# Patient Record
Sex: MALE | Race: WHITE
[De-identification: names, ages, dates, MRNs, and addresses within clinical notes are randomized per-mention and may not be internally consistent; named-entity substitution may affect disease eponyms.]

---

## 2018-09-20 ENCOUNTER — HOSPITAL ENCOUNTER (INPATIENT)
Dept: HOSPITAL 96 - M.ERS | Age: 83
LOS: 5 days | Discharge: NURSING, REHAB OR LONG-TERM CARE | DRG: 286 | End: 2018-09-25
Attending: INTERNAL MEDICINE | Admitting: INTERNAL MEDICINE
Payer: COMMERCIAL

## 2018-09-20 VITALS — DIASTOLIC BLOOD PRESSURE: 48 MMHG | SYSTOLIC BLOOD PRESSURE: 141 MMHG

## 2018-09-20 VITALS — SYSTOLIC BLOOD PRESSURE: 170 MMHG | DIASTOLIC BLOOD PRESSURE: 50 MMHG

## 2018-09-20 VITALS — DIASTOLIC BLOOD PRESSURE: 48 MMHG | SYSTOLIC BLOOD PRESSURE: 165 MMHG

## 2018-09-20 VITALS — BODY MASS INDEX: 23.4 KG/M2 | WEIGHT: 158 LBS | HEIGHT: 69.02 IN

## 2018-09-20 VITALS — SYSTOLIC BLOOD PRESSURE: 147 MMHG | DIASTOLIC BLOOD PRESSURE: 48 MMHG

## 2018-09-20 DIAGNOSIS — L89.892: ICD-10-CM

## 2018-09-20 DIAGNOSIS — Z79.899: ICD-10-CM

## 2018-09-20 DIAGNOSIS — Z79.82: ICD-10-CM

## 2018-09-20 DIAGNOSIS — R07.89: ICD-10-CM

## 2018-09-20 DIAGNOSIS — J15.6: ICD-10-CM

## 2018-09-20 DIAGNOSIS — Z95.820: ICD-10-CM

## 2018-09-20 DIAGNOSIS — I50.22: ICD-10-CM

## 2018-09-20 DIAGNOSIS — L03.116: ICD-10-CM

## 2018-09-20 DIAGNOSIS — E11.51: ICD-10-CM

## 2018-09-20 DIAGNOSIS — Z79.4: ICD-10-CM

## 2018-09-20 DIAGNOSIS — I70.203: ICD-10-CM

## 2018-09-20 DIAGNOSIS — I25.5: ICD-10-CM

## 2018-09-20 DIAGNOSIS — R00.1: ICD-10-CM

## 2018-09-20 DIAGNOSIS — L02.212: ICD-10-CM

## 2018-09-20 DIAGNOSIS — I25.10: Primary | ICD-10-CM

## 2018-09-20 DIAGNOSIS — I11.0: ICD-10-CM

## 2018-09-20 DIAGNOSIS — L72.3: ICD-10-CM

## 2018-09-20 DIAGNOSIS — R13.10: ICD-10-CM

## 2018-09-20 DIAGNOSIS — Z88.8: ICD-10-CM

## 2018-09-20 DIAGNOSIS — G20: ICD-10-CM

## 2018-09-20 LAB
ABSOLUTE BASOPHILS: 0 THOU/UL (ref 0–0.2)
ABSOLUTE EOSINOPHILS: 0.2 THOU/UL (ref 0–0.7)
ABSOLUTE MONOCYTES: 0.8 THOU/UL (ref 0–1.2)
ALBUMIN SERPL-MCNC: 2.4 G/DL (ref 3.4–5)
ALP SERPL-CCNC: 110 U/L (ref 46–116)
ALT SERPL-CCNC: 5 U/L (ref 30–65)
ANION GAP SERPL CALC-SCNC: 2 MMOL/L (ref 7–16)
AST SERPL-CCNC: 19 U/L (ref 15–37)
BASOPHILS NFR BLD AUTO: 0.2 %
BILIRUB SERPL-MCNC: 0.7 MG/DL
BUN SERPL-MCNC: 30 MG/DL (ref 7–18)
CALCIUM SERPL-MCNC: 8.4 MG/DL (ref 8.5–10.1)
CHLORIDE SERPL-SCNC: 105 MMOL/L (ref 98–107)
CO2 SERPL-SCNC: 30 MMOL/L (ref 21–32)
CREAT SERPL-MCNC: 1.3 MG/DL (ref 0.6–1.3)
EOSINOPHIL NFR BLD: 1.9 %
GLUCOSE SERPL-MCNC: 122 MG/DL (ref 70–99)
GRANULOCYTES NFR BLD MANUAL: 80 %
HCT VFR BLD CALC: 37.6 % (ref 42–52)
HGB BLD-MCNC: 12.2 GM/DL (ref 14–18)
LIPASE: 106 U/L (ref 73–393)
LYMPHOCYTES # BLD: 1.4 THOU/UL (ref 0.8–5.3)
LYMPHOCYTES NFR BLD AUTO: 11.3 %
MAGNESIUM SERPL-MCNC: 1.8 MG/DL (ref 1.8–2.4)
MCH RBC QN AUTO: 31.6 PG (ref 26–34)
MCHC RBC AUTO-ENTMCNC: 32.4 G/DL (ref 28–37)
MCV RBC: 97.4 FL (ref 80–100)
MONOCYTES NFR BLD: 6.6 %
MPV: 7.1 FL. (ref 7.2–11.1)
NEUTROPHILS # BLD: 10.2 THOU/UL (ref 1.6–8.1)
NT-PRO BRAIN NAT PEPTIDE: 8907 PG/ML (ref ?–300)
NUCLEATED RBCS: 0 /100WBC
PLATELET COUNT*: 292 THOU/UL (ref 150–400)
POTASSIUM SERPL-SCNC: 4.2 MMOL/L (ref 3.5–5.1)
PROT SERPL-MCNC: 6.4 G/DL (ref 6.4–8.2)
RBC # BLD AUTO: 3.86 MIL/UL (ref 4.5–6)
RDW-CV: 13.2 % (ref 10.5–14.5)
SODIUM SERPL-SCNC: 137 MMOL/L (ref 136–145)
TROPONIN-I LEVEL: <0.06 NG/ML (ref ?–0.06)
WBC # BLD AUTO: 12.7 THOU/UL (ref 4–11)

## 2018-09-20 NOTE — EKG
[place] [address]
Phone:  [phone]                     ELECTROCARDIOGRAM REPORT      
_______________________________________________________________________________
 
Name:       TONY RICHARDSON              Room:           [address]    ADM [place]  
.R.#:  [id number]      Account #:      [id number]  
Admission:  18     Attend Phys:    Sarah Pacheco MD 
Discharge:               Date of Birth:  [birth year]  
         Report #: [id number]
    [id number]
_______________________________________________________________________________
THIS REPORT FOR:  //name//                      
 
                         [facility] ED
                                       
Test Date:    2018               Test Time:    11:09:21
Pat Name:     TONY RICHARDSON          Department:   
Patient ID:   [id number]            Room:         [facility]
Gender:       M                        Technician:   JESSICA ARCINIEGA
:          1935               Requested By: Lalito Mi
Order Number: [id number] MD:   Armando Gonzalez
                                 Measurements
Intervals                              Axis          
Rate:         53                       P:            22
ND:           178                      QRS:          25
QRSD:         112                      T:            227
QT:           456                                    
QTc:          429                                    
                           Interpretive Statements
Sinus rhythm
Probable LVH with secondary repol abnrm
Possible inferior infarct, age indeterminate
Baseline wander in lead(s) V1,V3
No previous ECG available for comparison
 
Electronically Signed On 2018 16:48:41 CDT by Armando Gonzalez
[email or web address]
 
 
 
 
 
 
 
 
 
 
 
 
 
 
 
 
 
  <ELECTRONICALLY SIGNED>
                                           By: Armando Gonzalez MD, FACC     
  18     1648
D: 18 1109   _____________________________________
T: 18 1109   Armando Gonzalez MD, [facility]       /EPI

## 2018-09-20 NOTE — NUR
PT. ARRIVED TO UNIT AT APPROX. 1545.  PT A/OX4 WITH SOME NOTED FORGETFULNESS,
VSS, MONITOR PLACED TRACING SB.  PT. STATES HE HAS RIGHT SIDED CHEST PAIN WITH
BREATHING/MOVEMENT 3/10.  ADMISSION AND ASSESSMENT PROCESS  COMPLETED, REFER
TO CHARTING.  PT. PARKINSON MEDS REORDERED PER HOME DOSE FROM DAUGHTER PER
REQUEST.  ALL OTHER HOME MEDS REORDERED AS WELL.  DR. CHAN ON UNIT TO SEE PT,
ABLE TO DISCUSS PLAN WITH DAUGHTER AT BEDSIDE.  PT. DAUGHTER DOES WISH TO
SPEAK WITH CM ABOUT POSSIBLE PLACEMENT, SHE WISHES TO SPEAK WITH THEM PRIOR TO
THEM SPEAKING WITH PT.  PT. CURRENTLY LIVES AT HOME WITH DAUGHTER LILIAN.  PT.
ORIENTED TO ROOM/PROCEDURE.  CALL LIGHT IN REACH, FALL PRECAUTIONS IN PLACE.
WILL CONTINUE WITH PLAN OF CARE.

## 2018-09-21 VITALS — SYSTOLIC BLOOD PRESSURE: 151 MMHG | DIASTOLIC BLOOD PRESSURE: 62 MMHG

## 2018-09-21 VITALS — SYSTOLIC BLOOD PRESSURE: 82 MMHG | DIASTOLIC BLOOD PRESSURE: 58 MMHG

## 2018-09-21 VITALS — DIASTOLIC BLOOD PRESSURE: 63 MMHG | SYSTOLIC BLOOD PRESSURE: 177 MMHG

## 2018-09-21 VITALS — SYSTOLIC BLOOD PRESSURE: 109 MMHG | DIASTOLIC BLOOD PRESSURE: 79 MMHG

## 2018-09-21 VITALS — DIASTOLIC BLOOD PRESSURE: 69 MMHG | SYSTOLIC BLOOD PRESSURE: 168 MMHG

## 2018-09-21 VITALS — DIASTOLIC BLOOD PRESSURE: 58 MMHG | SYSTOLIC BLOOD PRESSURE: 93 MMHG

## 2018-09-21 VITALS — SYSTOLIC BLOOD PRESSURE: 143 MMHG | DIASTOLIC BLOOD PRESSURE: 53 MMHG

## 2018-09-21 VITALS — SYSTOLIC BLOOD PRESSURE: 115 MMHG | DIASTOLIC BLOOD PRESSURE: 61 MMHG

## 2018-09-21 VITALS — SYSTOLIC BLOOD PRESSURE: 169 MMHG | DIASTOLIC BLOOD PRESSURE: 65 MMHG

## 2018-09-21 VITALS — SYSTOLIC BLOOD PRESSURE: 177 MMHG | DIASTOLIC BLOOD PRESSURE: 68 MMHG

## 2018-09-21 VITALS — DIASTOLIC BLOOD PRESSURE: 71 MMHG | SYSTOLIC BLOOD PRESSURE: 173 MMHG

## 2018-09-21 VITALS — SYSTOLIC BLOOD PRESSURE: 172 MMHG | DIASTOLIC BLOOD PRESSURE: 56 MMHG

## 2018-09-21 VITALS — DIASTOLIC BLOOD PRESSURE: 60 MMHG | SYSTOLIC BLOOD PRESSURE: 158 MMHG

## 2018-09-21 VITALS — SYSTOLIC BLOOD PRESSURE: 159 MMHG | DIASTOLIC BLOOD PRESSURE: 64 MMHG

## 2018-09-21 LAB
ANION GAP SERPL CALC-SCNC: 6 MMOL/L (ref 7–16)
BUN SERPL-MCNC: 32 MG/DL (ref 7–18)
CALCIUM SERPL-MCNC: 7.9 MG/DL (ref 8.5–10.1)
CHLORIDE SERPL-SCNC: 105 MMOL/L (ref 98–107)
CHOLEST SERPL-MCNC: 157 MG/DL (ref ?–200)
CO2 SERPL-SCNC: 26 MMOL/L (ref 21–32)
CREAT SERPL-MCNC: 1.2 MG/DL (ref 0.6–1.3)
GLUCOSE SERPL-MCNC: 112 MG/DL (ref 70–99)
HDLC SERPL-MCNC: 54 MG/DL (ref 40–?)
LDLC SERPL-MCNC: 91 MG/DL (ref ?–100)
POTASSIUM SERPL-SCNC: 4.2 MMOL/L (ref 3.5–5.1)
SODIUM SERPL-SCNC: 137 MMOL/L (ref 136–145)
TC:HDL: 2.9 RATIO
TRIGL SERPL-MCNC: 61 MG/DL (ref ?–150)
VLDLC SERPL CALC-MCNC: 12 MG/DL (ref ?–40)

## 2018-09-21 PROCEDURE — B2111ZZ FLUOROSCOPY OF MULTIPLE CORONARY ARTERIES USING LOW OSMOLAR CONTRAST: ICD-10-PCS | Performed by: INTERNAL MEDICINE

## 2018-09-21 PROCEDURE — 4A023N7 MEASUREMENT OF CARDIAC SAMPLING AND PRESSURE, LEFT HEART, PERCUTANEOUS APPROACH: ICD-10-PCS | Performed by: INTERNAL MEDICINE

## 2018-09-21 PROCEDURE — B2151ZZ FLUOROSCOPY OF LEFT HEART USING LOW OSMOLAR CONTRAST: ICD-10-PCS | Performed by: INTERNAL MEDICINE

## 2018-09-21 NOTE — NUR
PT ALERT ORIENTED X 4. PARKENSON'S TREMORS NOTED. UP TO BSC WITH ASSIST OF
ONE. PT WEARING BRIEF WHEN HE CAME IN. REMOVED FOR SLEEP. PT ENCOURAGED TO
CALL OUT TO STAFF FOR ASSIT WITH VOIDING. RESTING COMFORTABLY. L FOOT ULCER
PICTURED AND PLACED IN CHART. WOUND NURSE CONSULTED. COCCYX SLIGHTLY RED. PT
TURNED ON SIDE AT TIMES REFUSES TO BE ON SIDE AND LAYS FLAT ON BACK. TELEMETRY
SHOWS SR/SB HR 60S-40S. WILL CONTINUE TO MONITOR.

## 2018-09-21 NOTE — CARD
[address]  [address]                    CARDIAC CATH REPORT           
_______________________________________________________________________________
 
Name:       TONY RICHARDSON VALDEMAR              Room:           [address] [place]  
.R.#:  [id number]      Account #:      [id number]  
Admission:  09/20/18     Attend Phys:    Sarah Pacheco MD 
Discharge:               Date of Birth:  [birth year]  
         Report #: [id number]
                                                                     [id number]
_______________________________________________________________________________
THIS REPORT FOR:  //name//                      
 
 
--------------- ADDENDUM APPROVED REPORT --------------
 
 
Study performed:  09/21/2018 11:02:06
 
Patient Details
Patient Status: In-Patient                  Room #: 214
The patient is a 83 year-old male
 
Event Personnel
Geovani Garcia  Cardiologist, Madelin Ruth, Cezar Loera (Osiris Steele Tina RN Circulator
 
Procedures Performed
Art Access - R radial artery  , Left Heart CatheterizationLeft Heart 
Cath w/or w/o Coronaries [id number] [facility] , Selective Right and Left 
Coronary Angiography
 
Procedure Narrative
The patient was brought electively to the Cardiac Catheterization 
Laboratory and was prepped and draped in a sterile manner. The right 
femoral was infiltrated with 2% Lidocaine subcutaneous anesthesia. A 
6fr Ultimum Sheath sheath was inserted into the right femoral artery. 
Coronary angiography was performed using coronary diagnostic 
catheters. The right coronary system was accessed and visualized with 
a 6fr JR 4 catheter. The left coronary system was accessed and 
visualized with a 6fr JL 4 catheter. The left ventricle was accessed 
and visualized with a 6fr Angled Pigtail catheter. Left 
ventricular/Aortic Valve gradient assessed via catheter pullback. 
Left ventriculogram was performed in PELLETIER projection. Pre-demployment 
femoral angiogram was performed . Closure device was deployed with a 
6 Fr MynxGrip 6/7FMynx. The patient tolerated the procedure well and 
there were no complications associated with the procedure. 
 
Intraoperative Conscious Sedation
Sedation start time:  12:23           Case end Time:  12:52   
No sedation given.      
 
Fluoro Time:    3.2 minutes     
Dose:     DAP 26066 cGycm2  886.21 mGy  
Contrast Type and Amount:  Visipaque 180 ml    
 
Diagnostic Cath
 
 
 
[place] [address]                    CARDIAC CATH REPORT           
_______________________________________________________________________________
 
Name:       TONY RICHARDSON              Room:           [address] [place]  
[facility]#:  [id number]      Account #:      [id number]  
Admission:  09/20/18     Attend Phys:    Sarah Pacheco MD 
Discharge:               Date of Birth:  [birth year]  
         Report #: [id number]
                                                                     [id number]
_______________________________________________________________________________
Left Main normal
LAD  proximal 80%, long 95% mid body, small caliber distally, 
severely diseased
Diagonal 1 small
Circumflex nondomnant, occluded proximally
collaterals present
OM1  long 95%, RI distribution
Right Coronary proximal 80%
R PDA  proximal 70%
RPLV  large severe 90% distal
 
Left Ventriculography
The left ventricle is moderately dilated in size with reduced 
contractility. The left ventricular ejection fraction is estimated to 
be 25-30%. Left ventricular wall motion abnormalities are present. 
There is no mitral insufficiency. severe apical hypokinesis, global 
dysfxn., no thrombus
 
Hemodynamics
The aortic pressure is 153/44 mmHg with a mean of mmHg. The left 
ventricular pressure is 157/16 mmHg with a mean of mmHg. The left 
ventricular end diastolic pressure is 28 mmHg. There was no gradient 
across the aortic valve upon pullback. 
 
Conclusion
1. multivessel CAD, as noted above
2. ischemic cardiomyopathy, severe LV dysfunction 
 
Recommendations
Aggressive Medical Therapy
consider high risk PCI
 
 
 
 
 
 
 
 
 
 
 
 
 
<ELECTRONICALLY SIGNED>
                                        By:  Geovani Garcia MD, FACC   
09/21/18     1620
D: 09/21/18 1620_______________________________________
T: 09/21/18 1620Geovani Garcia MD, FACC      /INF

## 2018-09-21 NOTE — NUR
PT JUST RETURNED FROM CATH, MET WITH DTR/LILIAN IN WAITING ROOM PER REQUEST. DTR
TEARFUL AFTER RESULTS OF CATH.  DISCUSSED HOME SITUATION/DC PLANNING. PT LIVES
WITH DTR AND BELLE/WHO IS A PHYSICAL THERAPIST.  PT USES WALKER AND NEEDS ASSIST
WITH ADLS, HAS BEEN NEEDING INCREASED HELP LATELY. DTR ALSO STATES FORGETFUL
AND HAVING CONCERNS OVER SAFETY ISSUES.  PT USED TO LIVE IN [facility] AT THE [place],
IS RETIRED .  DTR STATED THEY WERE INTERESTED IN SNF AND POSSIBLE LTC.
GAVE LIST, STATED PT HAD MENTIONED [facility].  DID CALL AND LEAVE VMAIL FOR
ADMISSIONS, AWAIT CALL BACK.  TALKED WITH DTR ABOUT LTC TRANSITION IF THEY
DECIDE TO DO THAT.  THEY WILL CONSIDER OTHER OPTIONS AND AWAIT THERAPY EVALS.
ANTICIPATE DC FIRST OF WEEK.  COPY OF DPOA AND AD ON CHART.  DTR/LILIAN IS
PRIMARY DPOA, OTHER CHILDREN LIVE OUT OF TOWN. WILL FOLLOW

## 2018-09-21 NOTE — NUR
Nutrition: PMHx: DM, CHF, PVD, HTN, Parkinsons. Admitted for chest pain. Has
Left foot wound. RX: insulin, aspirin. +BM. Wt: 150#. NPO currently. Labs: alb
2.4, prealb 13.1, BG .
Increased nutrient needs R/T wound healing AEB foot wound, PVD, labs above.
RD ordered Melvin b.i.d. to aid in wound healing.
Consider Mild nutritional risk at this time. Will follow up 9/26/18.

## 2018-09-21 NOTE — NUR
DAUGHTER REFUSED PM INSULIN STATING HE TAKES IT IN THE AM BECAUSE HIS BS DROP.
PM INSULIN HELD PER DTR REQUEST. INITALLY ON RA. O2 PLACED ON PT PER PT FOR
SAT 89%

## 2018-09-21 NOTE — NUR
WOUND CARE NOTE:  CONSULT RECEIVED FOR EVAL FOOT DECUB.
 
PATIENT PRESENTS WITH A FULL THICKNESS ULCERATION TO THE LEFT LATERAL FOOT AT
THE 5TH METATARSAL HEAD.  WOUND BED IS RED, MOIST WITH A SMALL AREA OF YELLOW
SLOUGH.  MANOJ-WOUND IS EDEMATOUS AND RED.  PALPABLE PEDAL PULSES.  2+ EDEMA TO
LOWER EXTREMITY.  PATIENT HAS HAD HIS 2ND TOE ON THE LEFT FOOT REMOVED,
INCISION LINE HAS HEALED WELL.  PATIENT STATES THAT HE HAD IT REMOVED A COUPLE
OF YEARS AGO.  PATIENT IS UNSURE OF THE ETIOLOGY OF THE WOUND, BUT HE DOES
HAVE DIABETES.  ADMITS TO HAVING SHOOTING PAINS IN HIS LEFT FOOT AT NIGHT.
AFTER CLEANSING THE WOUND, APPLIED  AQUACEL AG TO WOUND BED THEN SECURED WITH
BORDERED FOAM.
 
EDUCATED PATIENT ON DRESSING SELECTION, COMMUNICATED UNDERSTANDING.
 
RECOMMEND
TIGHT BLOOD GLUCOSE CONTROL
ENCOURAGE GOOD NUTRTION/HYDRATION
VASCULAR SURGERY CONSULT-ABNORMAL ARTERIAL STUDIES AND WOUND CARE
FOLLOW UP IN WOUND CENTER
CULTURES OF LEFT FOOT WOUND-DONE
OFFLOAD PRESSURE AT WOUND SITE

## 2018-09-22 VITALS — SYSTOLIC BLOOD PRESSURE: 124 MMHG | DIASTOLIC BLOOD PRESSURE: 59 MMHG

## 2018-09-22 VITALS — SYSTOLIC BLOOD PRESSURE: 142 MMHG | DIASTOLIC BLOOD PRESSURE: 46 MMHG

## 2018-09-22 VITALS — SYSTOLIC BLOOD PRESSURE: 132 MMHG | DIASTOLIC BLOOD PRESSURE: 54 MMHG

## 2018-09-22 VITALS — SYSTOLIC BLOOD PRESSURE: 150 MMHG | DIASTOLIC BLOOD PRESSURE: 54 MMHG

## 2018-09-22 VITALS — SYSTOLIC BLOOD PRESSURE: 144 MMHG | DIASTOLIC BLOOD PRESSURE: 48 MMHG

## 2018-09-22 VITALS — SYSTOLIC BLOOD PRESSURE: 158 MMHG | DIASTOLIC BLOOD PRESSURE: 47 MMHG

## 2018-09-22 LAB
ABSOLUTE BASOPHILS: 0 THOU/UL (ref 0–0.2)
ABSOLUTE EOSINOPHILS: 0.4 THOU/UL (ref 0–0.7)
ABSOLUTE MONOCYTES: 1 THOU/UL (ref 0–1.2)
ALBUMIN SERPL-MCNC: 2.1 G/DL (ref 3.4–5)
ALP SERPL-CCNC: 102 U/L (ref 46–116)
ALT SERPL-CCNC: < 6 U/L (ref 30–65)
ANION GAP SERPL CALC-SCNC: 4 MMOL/L (ref 7–16)
AST SERPL-CCNC: 18 U/L (ref 15–37)
BASOPHILS NFR BLD AUTO: 0.1 %
BILIRUB SERPL-MCNC: 0.4 MG/DL
BUN SERPL-MCNC: 29 MG/DL (ref 7–18)
CALCIUM SERPL-MCNC: 7.5 MG/DL (ref 8.5–10.1)
CHLORIDE SERPL-SCNC: 107 MMOL/L (ref 98–107)
CO2 SERPL-SCNC: 29 MMOL/L (ref 21–32)
CREAT SERPL-MCNC: 1.3 MG/DL (ref 0.6–1.3)
EOSINOPHIL NFR BLD: 4 %
GLUCOSE SERPL-MCNC: 94 MG/DL (ref 70–99)
GRANULOCYTES NFR BLD MANUAL: 73 %
HCT VFR BLD CALC: 32.8 % (ref 42–52)
HGB BLD-MCNC: 10.9 GM/DL (ref 14–18)
LYMPHOCYTES # BLD: 1.4 THOU/UL (ref 0.8–5.3)
LYMPHOCYTES NFR BLD AUTO: 13.4 %
MAGNESIUM SERPL-MCNC: 1.8 MG/DL (ref 1.8–2.4)
MCH RBC QN AUTO: 32.7 PG (ref 26–34)
MCHC RBC AUTO-ENTMCNC: 33.3 G/DL (ref 28–37)
MCV RBC: 98 FL (ref 80–100)
MONOCYTES NFR BLD: 9.5 %
MPV: 7.7 FL. (ref 7.2–11.1)
NEUTROPHILS # BLD: 7.6 THOU/UL (ref 1.6–8.1)
NUCLEATED RBCS: 0 /100WBC
PLATELET COUNT*: 250 THOU/UL (ref 150–400)
POTASSIUM SERPL-SCNC: 4.3 MMOL/L (ref 3.5–5.1)
PROT SERPL-MCNC: 5.3 G/DL (ref 6.4–8.2)
RBC # BLD AUTO: 3.34 MIL/UL (ref 4.5–6)
RDW-CV: 13.1 % (ref 10.5–14.5)
SODIUM SERPL-SCNC: 140 MMOL/L (ref 136–145)
WBC # BLD AUTO: 10.5 THOU/UL (ref 4–11)

## 2018-09-22 NOTE — NUR
VSS,CARDIAC MONITORING IN PLACE WITH NO CHANGES.PT REMAINS ON 2L O2 NC.PT
PROGRESSING TOWARDS GOALS.NO C/O PAIN.IV ANTIBIOTICS GIVEN.Q2 HOUR POSITION
CHANGE.PT WORKED WITH PT/OT AND WAS UP IN CHAIR FOR A COUPLE OF HOURS.HOURLY
ROUNDING COMPLETED FOR PT SAFETY.CALL LIGHT AND FALL PRECAUTIONS IN PLACE.WILL
CONTINUE TO MONITOR FOR DURATION OF SHIFT.

## 2018-09-22 NOTE — NUR
END SHIFT: PT WAS ANXIOUS AT BEG OF SHIFT D/T CATH RESULTS TODAY, HOWEVER HE
RESTED WELL WITH NO OTHER COMPLAINTS. NO PAIN. REFUSED TURNING AT TIMES.
INCONT EPISODES NOTED. TREMOR RIGHT SIDE. RIGHT GROIN CDI. SR 1ST DEG ON
MONITOR. CLARIFIED WITH DAUGHTER WHO DOES PATIENTS MEDS AT HOME THAT HE IN
FACT DOES TAKE LANTUS IN THE AM AND NOT PM. WOUND DRESSING TO LEFT FOOT IS DRY
AND INTACT. SAFETY PRECAUTIONS IN PLACE. CALL LIGHT IN REACH. PERFORMED HOURLY
ROUNDING. WILL CONT TO MONITOR.

## 2018-09-22 NOTE — NUR
RECEIVED WRITTEN REPORT AND ASSUMED CARE OF PT @ 0800.PT IS A/O BUT FORGETFUL
AT TIMES.VSS,TRACING SB ON THE MONITOR.LUNG SOUNDS ARE CLEAR DIMINSHED ON ROOM
AIR.LAST BM WAS 09/20/18.PT IS INCONT OF BLADDER.IV PATENT AND SALINE
LOCKED.PT IS CALM AND COOPERATIVE WITH NO C/O PAIN AT TIME OF
ASSESSMENT.FAMILY AT BEDSIDE.PT IS UP WITH 1-2 ASSIST TO BSC OR CHAIR.PT SAT
UP IN CHAIR FOR SEVERAL HOURS THIS AM.PT LEFT RESTING IN BED WITH CALL LIGHT
AND FALL PRECAUTIONS IN PLACE.WILL CONTINUE TO MONITOR.

## 2018-09-23 VITALS — SYSTOLIC BLOOD PRESSURE: 162 MMHG | DIASTOLIC BLOOD PRESSURE: 61 MMHG

## 2018-09-23 VITALS — SYSTOLIC BLOOD PRESSURE: 150 MMHG | DIASTOLIC BLOOD PRESSURE: 52 MMHG

## 2018-09-23 VITALS — SYSTOLIC BLOOD PRESSURE: 166 MMHG | DIASTOLIC BLOOD PRESSURE: 80 MMHG

## 2018-09-23 VITALS — SYSTOLIC BLOOD PRESSURE: 174 MMHG | DIASTOLIC BLOOD PRESSURE: 79 MMHG

## 2018-09-23 VITALS — DIASTOLIC BLOOD PRESSURE: 49 MMHG | SYSTOLIC BLOOD PRESSURE: 136 MMHG

## 2018-09-23 VITALS — DIASTOLIC BLOOD PRESSURE: 57 MMHG | SYSTOLIC BLOOD PRESSURE: 143 MMHG

## 2018-09-23 NOTE — NUR
VSS,CARDIAC MONITORING IN PLACE WITH NO CHANGES.PT ON ROOM AIR.PT PROGRESSING
TOWARDS GOALS.NO C/O PAIN.IV ANTIBIOTICS GIVEN.Q2 HOUR POSITION
CHANGE.PICTURES TAKEN OF WOUNDS.DRESSING CHANGED.PT UP TO CHAIR FOR SEVERAL
HOURS.HOURLY ROUNDING COMPLETED FOR PT SAFETY.CALL LIGHT AND FALL PRECAUTIONS
IN PLACE.WILL CONTINUE TO MONITOR FOR DURATION OF SHIFT.

## 2018-09-23 NOTE — NUR
RECEIVED REPORT FROM MALGORZATA AND ASSUMED CARE @ 3095.PT IS A/O BUT FORGETFUL AT
TIMES.VSS, TRACING SB ON THE MONITOR.PT HAD CRITICALLY LOW BLOOD GLUCOSE THIS
AM-DRANK JUICE AND PEANUT BUTTER AND LILLI CRACKERS TO INCREASE.LUNG SOUNDS
ARE CLEAR DIMINSHED.LAST BM WAS YESTERDAY.IV PATENT AND SALINE LOCKED.PT IS
CALM AND COOPERATIVE WITH NO C/O PAIN AT TIME OF ASSESSMENT.PT IS UP WITH TWO
TO BSC OR CHAIR.PT LEFT RESTING IN BED WITH CALL LIGHT AND FALL PRECAUTIONS IN
PLACE. WILL CONTINUE TO MONITOR.

## 2018-09-23 NOTE — NUR
ASSUMED CARE OF PATIENT AT 1900 THE PATIENT REMAINS SB ON THE TELEMONITOR
O2 SAT IS MAINTAINED ON 2L NC
THE PATIENT CONTINUES TO HAVE INCONT EPISODES HOWEVER IS ABLE TO ALERT STAFF
AFTER OCCURENCE
ROUTINE INTERVENTIONS CONTINUE TO BE EFFECTIVE FOR SX MANAGEMENT
PATIENT CONTINUES TO PROGRESS TOWARDS GOALS
NIGHT UNEVENTFUL
SAFETY INTERVENTIONS CONTINUE BED LOWERED WHEELS LOCKED SIDE RAILS UP X 2 CALL
LIGHT IN REACH REPORT TO BE GIVEN TO GARRETT MCMANUS

## 2018-09-24 VITALS — DIASTOLIC BLOOD PRESSURE: 69 MMHG | SYSTOLIC BLOOD PRESSURE: 137 MMHG

## 2018-09-24 VITALS — DIASTOLIC BLOOD PRESSURE: 57 MMHG | SYSTOLIC BLOOD PRESSURE: 115 MMHG

## 2018-09-24 VITALS — SYSTOLIC BLOOD PRESSURE: 151 MMHG | DIASTOLIC BLOOD PRESSURE: 76 MMHG

## 2018-09-24 VITALS — DIASTOLIC BLOOD PRESSURE: 68 MMHG | SYSTOLIC BLOOD PRESSURE: 138 MMHG

## 2018-09-24 VITALS — SYSTOLIC BLOOD PRESSURE: 115 MMHG | DIASTOLIC BLOOD PRESSURE: 51 MMHG

## 2018-09-24 VITALS — DIASTOLIC BLOOD PRESSURE: 74 MMHG | SYSTOLIC BLOOD PRESSURE: 138 MMHG

## 2018-09-24 PROCEDURE — 0W9K0ZZ DRAINAGE OF UPPER BACK, OPEN APPROACH: ICD-10-PCS

## 2018-09-24 NOTE — NUR
HEARD BACK FROM [facility], THEY DO HAVE A BED AVAILABLE. FAXED REFERRAL TO
HER FOR SNF

## 2018-09-24 NOTE — NUR
RE: HEART FAILURE MEDICATION EDUCATION. ATTEMPTED TO SPEAK W/PT ABOUT HEART
FAILURE MEDICATION. PT'S DAUGHTER REQUESTED I SPEAK TO HER, STATING SHE
COORDINATES HIS MEDICATION & PT WOULD NOT BE RECEPTIVE TO DISCUSSION.
DISCUSSION FOCUSED ON CARVEDILOL, LISINOPRIL AND SPIRONOLACTONE. WE REVIEWED
RATIONALE FOR THERAPY. DISCUSSED IMPORTANCE ADHERENCE TO PRESCRIBED REGIMEN.
WE REVIEWED POSSIBLE SIDE EFFECTS OF MEDICATION AND MANAGEMENT OF RISKS OF
THESE EFFECTS. LEFT MEDICATION INFORMATION WITH PT'S DAUGHTER. PROVIDED
PHARMACY CONTACT INFORMATION FOR ANY FURTHER QUESTIONS OR ISSUES. THANK YOU.

## 2018-09-24 NOTE — NUR
RECEIVED REPORT AND ASSUMED CARE OF PT, ASSESSMENT COMPLETED. C/O PAIN INTO
BACK INCISION. WILL OBTAIN MED ORDER. DRSG DRY AND INTACT. INCONT OF URINE,
ASSISTED WITH REPOSITIONING FOR CLEANING. SACRAL AREA RED, NO BREAKDOWN NOTED.
DRSG TO LT FOOT DRY AND INTACT. TELEMETRY ON SHOWING SR. WILL CONT TO MONITOR
AND ASSIST AS NEEDED.

## 2018-09-24 NOTE — NUR
PT IS ABLE TO COMMUNICATE HIS NEEDS TO STAFF EFFECTIVELY, HE IS FORGETFUL AT
TIMES. HE HAS DENIED THE NEED FOR PAIN MEDICATION UP TO THIS TIME. TREMOURS
FREQUENTLY; PARKINSON'S DISEASE. PT/OT/ST CONSULTED. POSSIBLE D/C TO A SNF IN
A DAY OR TWO.

## 2018-09-25 VITALS — SYSTOLIC BLOOD PRESSURE: 140 MMHG | DIASTOLIC BLOOD PRESSURE: 69 MMHG

## 2018-09-25 VITALS — SYSTOLIC BLOOD PRESSURE: 114 MMHG | DIASTOLIC BLOOD PRESSURE: 46 MMHG

## 2018-09-25 VITALS — DIASTOLIC BLOOD PRESSURE: 65 MMHG | SYSTOLIC BLOOD PRESSURE: 137 MMHG

## 2018-09-25 NOTE — NUR
SPOKE WITH [facility] AGAIN, SHE STATED THAT INSURANCE WAS ASKING FOR MORE
DETAILS ON OT EVAL.  CALL TO INSURANCE REVIEWER, SHE STATED SHE HAD INFO IN
EMR AND WOULD REVIEW IT SOON FOR SNF AUTH, MAY NOT HAVE BY END OF DAY TODAY.
UPDATED [facility] ON ISSUE.  AWAIT CALL BACK WITH AUTH

## 2018-09-25 NOTE — NUR
PATIENT DISCHARGED TO [facility]
ALL DISCHARGE INFORMATION GIVEN, COPIES SENT
IV AND HEART MONITOR REMOVED
PAERSONAL BELONGINGS RETURNED
REPORT GIVEN TO NURSE BONILLA AT [facility]
PATIENT ASSISTED TO WC AND PICKED UP WC VAN

## 2018-09-25 NOTE — NUR
CONTINUE TO FOLLOW, PLAN DC TODAY. SPOKE WITH [facility], THEY HAVE
CLINICALLY ACCEPTED PT BUT AWAITING INSURANCE AUTH.

## 2018-09-25 NOTE — NUR
SLEPT WELL. REPOSITIONED Q 2HR. PO PAIN MED GIVEN X1 AND EFFECTIVE. NO CHANGE
IN ASSESSMENT. INCONT OF URINE. TELEMETRY CONT TO SHOW SR. HS GOALS OF REST
AND SAFETY ACHIEVED. HOURLY ROUNDING OBSERVED.

## 2018-10-06 NOTE — CON
[address]  [address]                    CONSULTATION                  
_______________________________________________________________________________
 
Name:       TONY RICHARDSON              Room:           [address] [place]  
M.R.#:  [id number]      Account #:      [id number]  
Admission:  09/20/18     Attend Phys:    Sarah Pacheco MD 
Discharge:  09/25/18     Date of Birth:  [birth year]  
         Report #: [id number]
                                                                     [id number]  
_______________________________________________________________________________
THIS REPORT FOR:  //name//                      
 
CC: Sarah Pacheco
    Physician staff
    VICKIE JOSE
 
DATE OF SERVICE:  09/21/2018
 
 
INPATIENT CONSULTATION
 
REQUESTING PHYSICIAN:  Sarah Pacheco M.D.
 
CHIEF COMPLAINT:  Chest pain.
 
HISTORY OF PRESENT ILLNESS:  The patient is an 83-year-old male who has a
history of peripheral vascular disease, who had a severe episode of chest
discomfort yesterday.  It was central and right sided.  It did not radiate.  He
was not diaphoretic.  He has been short of breath.  His ECG demonstrated sinus
rhythm with diffuse ST-segment depression 1-3 mm along with poor R-wave
progression.  He has no documented history of coronary artery disease, but has a
history of severe peripheral vascular disease.  His symptoms of chest discomfort
really started about a week ago, but he had a severe episode yesterday.
 
He has significant Parkinson disease and has to walk with a walker.
 
There was a record of a slight fall about a week ago and he was not complaining
of chest pain at that time, though.
 
He is anticoagulated with Plavix.
 
PAST MEDICAL HISTORY:  He has a history of prior carotid endarterectomy in 2014.
 That same year, he underwent a [place] PTA and stenting for a left lower
extremity non-healing diabetic foot ulcer.  He has insulin-requiring diabetes. 
He has hypertension and Parkinson disease.  He has a remote history of silent
CVA, according to his daughter; he had a routine CT scan, which showed a small
vessel infarct, according to the patient, without any clinical symptoms.  He has
been on aspirin and Plavix since then and has not had any peripheral procedures
or neurovascular events.
 
As above, he has a history of a non-healing ulcer on his left foot also, which
was treated as an outpatient with Keflex.
 
HOME MEDICATIONS:  Include aspirin, Plavix, donepezil, carbidopa 50/200 mg
daily, propranolol 80 mg daily, Flomax 0.4 mg and melatonin.
 
 
 
 
[place] [address]                    CONSULTATION                  
_______________________________________________________________________________
 
Name:       TONY RICHARDSON              Room:           [address]#:  [id number]      Account #:      [id number]  
Admission:  09/20/18     Attend Phys:    Sarah Pacheco MD 
Discharge:  09/25/18     Date of Birth:  [birth year]  
         Report #: [id number]
                                                                     [id number]  
_______________________________________________________________________________
PAST SURGICAL HISTORY:  He has no recent surgeries.
 
SOCIAL HISTORY:  He is a nonsmoker, nondrinker.
 
REVIEW OF SYSTEMS:
GENERAL:  He has not had any febrile illnesses, cough, fevers or chills.
HEENT:  No visual changes, numbness or slurred speech.
NEUROLOGIC:  No seizures.  He does have a history of right-sided tremor.
HEMATOLOGIC:  No anemia or bleeding disorders.
RENAL:  No history of kidney failure.
ENDOCRINE:  Positive for diabetes.  No history of thyroid disease.
CARDIOVASCULAR:  Positive for chest pain.  Positive for dyspnea on exertion.
ALLERGIES:  No aspirin or contrast allergies.
 
PHYSICAL EXAMINATION:
VITAL SIGNS:  Stable.  Blood pressure is in the 120s/60s.  He is bradycardic
with heart rates in the 50s, in a sinus mechanism.  His O2 sats are 100% on 2
liters nasal cannula.
GENERAL:  This is a thin elderly male.  He is alert, in no apparent distress. 
He is oriented, but has a poor medical history.
HEENT:  There is mild facial droop.
NECK:  Supple.  There is no jugular venous distention.
CARDIOVASCULAR EXAMINATION:  Regular.  There is a faint systolic murmur.  There
is no rub or gallop.
LUNGS:  Clear to auscultation, diminished breath sounds in the bases.
ABDOMEN:  Nontender.
EXTREMITIES:  There is no peripheral edema.  Pulses, radial and femoral pulses
are intact.  Distal dorsalis pedis pulses are significantly reduced.  Distal
extremities are warm.
 
LABORATORY DATA:  A CT scan of the chest was done yesterday, which showed no CT
evidence for pulmonary embolism, but findings were consistent with congestive
heart failure.  Chest x-ray revealed possible pneumonia in the left lower lobe. 
White blood cell count is 12.7.
 
ECG as noted above.  Cardiac troponin levels 0.06 x 4 sets.
 
IMPRESSION AND PLAN:
1.  Acute-on-chronic systolic congestive heart failure.  I suspect his CT
findings are correct given his numerous cardiovascular risk factors.
2.  Chest pain.  His symptoms are a bit atypical, but his ECG is significantly
abnormal and he has numerous cardiovascular risk factors and inclusive of this
is significant peripheral vascular disease.  I discussed at length with his
daughter and the patient different diagnostic and treatment options and
recommended an evaluation with a cardiac catheterization as he may not be safe
for evaluation with a stress test.
 
 
 
[address] [place]  [address]                    CONSULTATION                  
_______________________________________________________________________________
 
Name:       TONY RICHARDSON              Room:           [address] [place]  
M.R.#:  [id number]      Account #:      [id number]  
Admission:  09/20/18     Attend Phys:    Sarah Pacheco MD 
Discharge:  09/25/18     Date of Birth:  [birth year]  
         Report #: [id number]
                                                                     [id number]  
_______________________________________________________________________________
3.  Peripheral vascular disease.  As noted above, he has had a prior carotid
endarterectomy and lower extremity revascularization.
4.  Bradycardia.  I will hold his propranolol for the time being.
5.  Diabetes mellitus, as per our hospital colleagues.
 
 
 
 
 
 
 
 
 
 
 
 
 
 
 
 
 
 
 
 
 
 
 
 
 
 
 
 
 
 
 
 
 
 
 
 
 
 
 
 
<ELECTRONICALLY SIGNED>
                                        By:  Geovani Garcia MD, FACC   
10/06/18     1210
D: 09/21/18 0946_______________________________________
T: 09/21/18 1151Geovani Garcia MD, FACC      /nt